# Patient Record
Sex: MALE | Employment: OTHER
[De-identification: names, ages, dates, MRNs, and addresses within clinical notes are randomized per-mention and may not be internally consistent; named-entity substitution may affect disease eponyms.]

---

## 2022-05-10 ENCOUNTER — OFFICE VISIT (OUTPATIENT)
Age: 67
End: 2022-05-10

## 2022-05-12 ENCOUNTER — TELEPHONE ENCOUNTER (OUTPATIENT)
Dept: URBAN - METROPOLITAN AREA CLINIC 9 | Facility: CLINIC | Age: 67
End: 2022-05-12

## 2022-06-01 ENCOUNTER — TELEPHONE ENCOUNTER (OUTPATIENT)
Dept: URBAN - METROPOLITAN AREA CLINIC 9 | Facility: CLINIC | Age: 67
End: 2022-06-01

## 2022-06-16 ENCOUNTER — OFFICE VISIT (OUTPATIENT)
Dept: URBAN - METROPOLITAN AREA SURGERY CENTER 5 | Facility: SURGERY CENTER | Age: 67
End: 2022-06-16

## 2022-07-11 ENCOUNTER — TELEPHONE ENCOUNTER (OUTPATIENT)
Dept: URBAN - METROPOLITAN AREA CLINIC 9 | Facility: CLINIC | Age: 67
End: 2022-07-11

## 2022-07-14 ENCOUNTER — OFFICE VISIT (OUTPATIENT)
Dept: URBAN - METROPOLITAN AREA SURGERY CENTER 5 | Facility: SURGERY CENTER | Age: 67
End: 2022-07-14

## 2022-07-30 ENCOUNTER — TELEPHONE ENCOUNTER (OUTPATIENT)
Age: 67
End: 2022-07-30

## 2022-07-30 RX ORDER — METRONIDAZOLE 500 MG/1
1 (ONE) TABLET TABLET ORAL
Qty: 0 | Refills: 2 | OUTPATIENT
Start: 2018-04-04 | End: 2018-04-11

## 2022-07-30 RX ORDER — METRONIDAZOLE 375 MG/1
1 (ONE) CAPSULE ORAL
Qty: 0 | Refills: 2 | OUTPATIENT
Start: 2019-12-13 | End: 2019-12-23

## 2022-07-30 RX ORDER — AMOXICILLIN AND CLAVULANATE POTASSIUM 500; 125 MG/1; MG/1
1 TABLET, FILM COATED ORAL
Qty: 0 | Refills: 2 | OUTPATIENT
Start: 2019-09-10 | End: 2019-09-11

## 2022-07-30 RX ORDER — METRONIDAZOLE 375 MG/1
1 (ONE) CAPSULE ORAL
Qty: 0 | Refills: 2 | OUTPATIENT
Start: 2019-09-11 | End: 2019-09-21

## 2022-07-30 RX ORDER — DOXYCYCLINE HYCLATE 100 MG/1
1 (ONE) CAPSULE ORAL
Qty: 0 | Refills: 2 | OUTPATIENT
Start: 2017-06-19 | End: 2017-06-29

## 2022-07-30 RX ORDER — PSYLLIUM SEED
1 TABLESPOON POWDER PACKET (EA) ORAL
Qty: 0 | Refills: 16 | OUTPATIENT
Start: 2018-04-09 | End: 2022-05-10

## 2022-07-31 ENCOUNTER — TELEPHONE ENCOUNTER (OUTPATIENT)
Age: 67
End: 2022-07-31

## 2022-07-31 RX ORDER — AMOXICILLIN AND CLAVULANATE POTASSIUM 500; 125 MG/1; MG/1
1 TABLET, FILM COATED ORAL
Qty: 0 | Refills: 2 | Status: ACTIVE | COMMUNITY
Start: 2019-09-10

## 2022-07-31 RX ORDER — DOXYCYCLINE HYCLATE 100 MG/1
1 (ONE) CAPSULE ORAL
Qty: 0 | Refills: 2 | Status: ACTIVE | COMMUNITY
Start: 2017-06-19

## 2022-07-31 RX ORDER — PSYLLIUM SEED
1 TABLESPOON PACKET (EA) ORAL
Qty: 0 | Refills: 16 | Status: ACTIVE | COMMUNITY
Start: 2018-04-09

## 2022-07-31 RX ORDER — METRONIDAZOLE 375 MG/1
1 (ONE) CAPSULE ORAL
Qty: 0 | Refills: 2 | Status: ACTIVE | COMMUNITY
Start: 2019-12-13

## 2022-07-31 RX ORDER — METRONIDAZOLE 500 MG/1
1 (ONE) TABLET ORAL
Qty: 0 | Refills: 2 | Status: ACTIVE | COMMUNITY
Start: 2018-04-04

## 2022-07-31 RX ORDER — METRONIDAZOLE 375 MG/1
1 (ONE) CAPSULE ORAL
Qty: 0 | Refills: 2 | Status: ACTIVE | COMMUNITY
Start: 2019-09-11

## 2025-07-18 ENCOUNTER — DASHBOARD ENCOUNTERS (OUTPATIENT)
Age: 70
End: 2025-07-18

## 2025-07-18 ENCOUNTER — OFFICE VISIT (OUTPATIENT)
Dept: URBAN - METROPOLITAN AREA CLINIC 7 | Facility: CLINIC | Age: 70
End: 2025-07-18
Payer: MEDICARE

## 2025-07-18 DIAGNOSIS — K57.32 DIVERTICULITIS OF LARGE INTESTINE WITHOUT PERFORATION OR ABSCESS WITHOUT BLEEDING: ICD-10-CM

## 2025-07-18 DIAGNOSIS — Z86.0100 PERSONAL HISTORY OF COLON POLYPS, UNSPECIFIED: ICD-10-CM

## 2025-07-18 DIAGNOSIS — R10.32 LLQ ABDOMINAL PAIN: ICD-10-CM

## 2025-07-18 DIAGNOSIS — R19.5 LOOSE STOOLS: ICD-10-CM

## 2025-07-18 PROCEDURE — 99204 OFFICE O/P NEW MOD 45 MIN: CPT | Performed by: INTERNAL MEDICINE

## 2025-07-18 RX ORDER — PSYLLIUM SEED
1 TABLESPOON PACKET (EA) ORAL
Qty: 0 | Refills: 16 | COMMUNITY
Start: 2018-04-09

## 2025-07-18 RX ORDER — METRONIDAZOLE 500 MG/1
1 (ONE) TABLET ORAL
Qty: 0 | Refills: 2 | COMMUNITY
Start: 2018-04-04

## 2025-07-18 RX ORDER — TIMOLOL MALEATE OPHTHALMIC 5 MG/ML
1 DROP INTO AFFECTED EYE SOLUTION/ DROPS OPHTHALMIC ONCE A DAY
Status: ACTIVE | COMMUNITY

## 2025-07-18 RX ORDER — DOXYCYCLINE HYCLATE 100 MG/1
1 (ONE) CAPSULE ORAL
Qty: 0 | Refills: 2 | COMMUNITY
Start: 2017-06-19

## 2025-07-18 RX ORDER — METRONIDAZOLE 375 MG/1
1 (ONE) CAPSULE ORAL
Qty: 0 | Refills: 2 | COMMUNITY
Start: 2019-12-13

## 2025-07-18 RX ORDER — CARVEDILOL 6.25 MG/1
1 TABLET WITH FOOD TABLET, FILM COATED ORAL TWICE A DAY
Status: ACTIVE | COMMUNITY

## 2025-07-18 RX ORDER — OLMESARTAN MEDOXOMIL, AMLODIPINE AND HYDROCHLOROTHIAZIDE 40; 10; 25 MG/1; MG/1; MG/1
1 TABLET TABLET, FILM COATED ORAL ONCE A DAY
Status: ACTIVE | COMMUNITY

## 2025-07-18 RX ORDER — TAMSULOSIN HYDROCHLORIDE 0.4 MG/1
1 CAPSULE CAPSULE ORAL ONCE A DAY
Status: ACTIVE | COMMUNITY

## 2025-07-18 RX ORDER — TAFLUPROST 0 MG/.3ML
1 DROP INTO AFFECTED EYE IN THE EVENING SOLUTION/ DROPS OPHTHALMIC ONCE A DAY
Status: ACTIVE | COMMUNITY

## 2025-07-18 RX ORDER — AMOXICILLIN AND CLAVULANATE POTASSIUM 500; 125 MG/1; MG/1
1 TABLET, FILM COATED ORAL
Qty: 0 | Refills: 2 | COMMUNITY
Start: 2019-09-10

## 2025-07-18 NOTE — HPI-TODAY'S VISIT:
Patient was last seen in the office in 2019 and is new to me.  It does appear that he had multiple episodes of diverticulitis back at that time and ultimately a colonoscopy was advised.  He was lost to follow-up for some time and I ultimately ended up doing his colonoscopy in July 2022 which demonstrated a small tubular adenoma in the ascending colon as well as moderate diverticulosis in the sigmoid and descending and small nonbleeding internal hemorrhoids.  Essentially lost to follow-up since that time.  Follow-up now for left lower quadrant abdominal pain. Had a pain that developed this past weekend. Was fairly sharp on the left, but now more vague, milder, not severe, some tenderness with palpation. Bowels are some what formed initially, but then has several more loose BMs. This started after his last colon.

## 2025-07-29 LAB
CALPROTECTIN, FECAL: 6
CAMPYLOBACTER SPP. AG,EIA: (no result)
CLOSTRIDIUM DIFFICILE: (no result)
CRYPTOSPORIDIUM ANTIGEN, EIA: (no result)
CULTURE: (no result)
GIARDIA AG, EIA, STOOL: (no result)
OVA AND PARASITES, CONC AND PERM SMEAR: (no result)
PANCREATIC ELASTASE, FECAL: >800
SHIGA TOXINS, EIA W/RFL TO E.COLI O157 CULTURE: (no result)

## 2025-07-30 ENCOUNTER — TELEPHONE ENCOUNTER (OUTPATIENT)
Dept: URBAN - METROPOLITAN AREA CLINIC 7 | Facility: CLINIC | Age: 70
End: 2025-07-30

## 2025-07-30 RX ORDER — METRONIDAZOLE 250 MG/1
1 TABLET TABLET ORAL
Qty: 30 TABLET | Refills: 0 | OUTPATIENT
Start: 2025-07-31 | End: 2025-08-10